# Patient Record
Sex: MALE | Race: OTHER | NOT HISPANIC OR LATINO | URBAN - METROPOLITAN AREA
[De-identification: names, ages, dates, MRNs, and addresses within clinical notes are randomized per-mention and may not be internally consistent; named-entity substitution may affect disease eponyms.]

---

## 2025-06-03 ENCOUNTER — OFFICE VISIT (OUTPATIENT)
Age: 25
End: 2025-06-03
Payer: COMMERCIAL

## 2025-06-03 VITALS
HEIGHT: 72 IN | DIASTOLIC BLOOD PRESSURE: 88 MMHG | WEIGHT: 315 LBS | HEART RATE: 93 BPM | BODY MASS INDEX: 42.66 KG/M2 | SYSTOLIC BLOOD PRESSURE: 124 MMHG

## 2025-06-03 DIAGNOSIS — K76.0 METABOLIC DYSFUNCTION-ASSOCIATED STEATOTIC LIVER DISEASE (MASLD): ICD-10-CM

## 2025-06-03 DIAGNOSIS — E66.9 OBESITY (BMI 35.0-39.9 WITHOUT COMORBIDITY): Primary | ICD-10-CM

## 2025-06-03 PROCEDURE — 99204 OFFICE O/P NEW MOD 45 MIN: CPT | Performed by: INTERNAL MEDICINE

## 2025-06-03 PROCEDURE — 3008F BODY MASS INDEX DOCD: CPT | Performed by: INTERNAL MEDICINE

## 2025-06-03 PROCEDURE — 99214 OFFICE O/P EST MOD 30 MIN: CPT | Performed by: INTERNAL MEDICINE

## 2025-06-03 RX ORDER — ALBUTEROL SULFATE 90 UG/1
2 INHALANT RESPIRATORY (INHALATION) EVERY 4 HOURS PRN
COMMUNITY
Start: 2015-03-09

## 2025-06-03 RX ORDER — ERGOCALCIFEROL 1.25 MG/1
1.25 CAPSULE ORAL WEEKLY
COMMUNITY

## 2025-06-03 ASSESSMENT — ENCOUNTER SYMPTOMS
LOSS OF SENSATION IN FEET: 0
DEPRESSION: 0
OCCASIONAL FEELINGS OF UNSTEADINESS: 0

## 2025-06-03 ASSESSMENT — PATIENT HEALTH QUESTIONNAIRE - PHQ9
2. FEELING DOWN, DEPRESSED OR HOPELESS: NOT AT ALL
1. LITTLE INTEREST OR PLEASURE IN DOING THINGS: NOT AT ALL
SUM OF ALL RESPONSES TO PHQ9 QUESTIONS 1 AND 2: 0

## 2025-06-03 NOTE — PROGRESS NOTES
Patient is seen at the request of Self Referred for my opinion regarding obesity and weight management. My final recommendations will be communicated back to the requesting provider by way of shared medical record.    Marcel Quinteros is a 25 y.o. male with a hx of morbid obesity s/p gastric sleeve bariatric surgery, MARIA ESTHER, Obesity hypoventilation syndrome, NAFLD and insulin resistance who presents for weight management and obesity.    1. Weight history: Max weight reported: 560lb pre bariatric surgery. Current weight: 357lb.  Endorses issues with weight management and excess calorie intake since teens.  --Any previous programs: Has attempted many including carnivore diet, keto, weight watchers, etc with varying results but overall inconsistent, and difficult to sustain.  Currently no diet but trying to eat healthy.    - As for medications for weight management, has been followed by [facility] weight management clinic for several years, now transferring care due to insurance change.  Per patient's report and record review he tried Topamax with phentermine for several months with good results in appetite management but this caused elevated BP.  Was on Zepbound for approximately 1 month this year at a dose of 5mg weekly but stopped due to cost, is now taking compounded Terzepatide 7.5mg weekly for past 5 weeks (still has 5 more week supply). Has not seen major weight loss over these 5 weeks although he has not been routinely monitoring this.    2. Sleep: inconsistent sleep, waking up during the night     3. Stress: manageable as per patient. Works and studies full time     4. Exercise: no formal exercise.     5. Appetite control: endorses good appetite, eating less since his bariatric surgery.  Any personal history of pancreatitis?: no  Any personal or family history of medullary thyroid cancer or MEN (multiple endocrine neoplasia)?: no    Current Medications[1]    ROS:  System: normal  Eyes : no visual  changes  Neck : no tenderness, no new lumps/bumps  Respiratory : no SOB  Cardiovascular : no chest pain, no palpitations  Gastro-Intestinal : no abdominal concerns  Neurological : no numbness or tingling in the extremities  Musculoskeletal : no joint paint, no muscle pain  Skin : no unusual rashes  Psychiatric : no depression, no anxiety  See HPI for Endocrine ROS    Medical History[2]    Surgical History[3]    Social History     Socioeconomic History    Marital status: Single     Spouse name: Not on file    Number of children: Not on file    Years of education: Not on file    Highest education level: Not on file   Occupational History    Not on file   Tobacco Use    Smoking status: Not on file    Smokeless tobacco: Not on file   Substance and Sexual Activity    Alcohol use: Not on file    Drug use: Not on file    Sexual activity: Not on file   Other Topics Concern    Not on file   Social History Narrative    Not on file     Social Drivers of Health     Financial Resource Strain: Not on file   Food Insecurity: Not on file   Transportation Needs: Not on file   Physical Activity: Not on file   Stress: Not on file   Social Connections: Not on file   Intimate Partner Violence: Not on file   Housing Stability: Not on file     Objective    Physical Exam:  Blood pressure 124/88, pulse 93, height 1.829 m (6'), weight (!) 162 kg (357 lb 9.4 oz).  General : alert and oriented X3, no acute distress  Eyes : EOMI   Neck : non tender, supple  Thyroid : Non tender, no bruit, no palpable nodules  Lungs : CTA B  Heart : regular rate and rhythm  ABD : no obvious abnormalities, soft to palpation  Extremities : no edema  Neuro : normal    Weight Curve From Prior Follow Up:      Assessment/Plan   Doug Quinteros is a 25 y.o. male with a hx of morbid obesity s/p gastric sleeve bariatric surgery, and resolved pre DM2 who presents for weight management and obesity.    1. Weight Management : Reviewed principles of energy metabolism,  caloric intake and expenditure, and rationale for a treatment program.  Also reinforced need for reduced calorie, low fat diet and increased physical activity.  Reviewed his inconsistent use of Tirzepatide and why he has not seen major weigh benefits yet. Patient endorsed interest in looking into the option of switching to a brand name [email or web address] for which we will explore which options have better coverage with his current insurance.  Lastly patient interested in dietitian referral as well as referral for follow up in weight management groups with Kalyani SUMNER CNP.    Plan:  - Will check for Mounjaro coverage, if cost is limiting will check coverage for Ozempic  - Follow up with Nutrition  - Follow up with Weigh management group  - Check A1C, 2h glucose tolerance test, CMP, Vitamin D, and Iron      Hardy Balderas MD       [1]   Current Outpatient Medications:     albuterol (ProAir HFA) 90 mcg/actuation inhaler, Inhale 2 puffs every 4 hours if needed., Disp: , Rfl:     ergocalciferol (Vitamin D-2) 1250 mcg (50,000 units) capsule, Take 1 capsule (1.25 mg) by mouth 1 (one) time per week., Disp: , Rfl:     [START ON 6/8/2025] tirzepatide (Mounjaro) 7.5 mg/0.5 mL pen injector, Inject 7.5 mg under the skin 1 (one) time per week., Disp: 2 mL, Rfl: 5    tirzepatide 7.5 mg/0.5 mL pen injector, Inject 7.5 mg under the skin 1 (one) time per week., Disp: , Rfl:   [2] No past medical history on file.  [3] No past surgical history on file.

## 2025-06-04 PROBLEM — E66.9 OBESITY (BMI 35.0-39.9 WITHOUT COMORBIDITY): Status: ACTIVE | Noted: 2025-06-04

## 2025-06-04 PROBLEM — K76.0 METABOLIC DYSFUNCTION-ASSOCIATED STEATOTIC LIVER DISEASE (MASLD): Status: ACTIVE | Noted: 2025-06-04

## 2025-06-04 RX ORDER — TIRZEPATIDE 7.5 MG/.5ML
7.5 INJECTION, SOLUTION SUBCUTANEOUS
Qty: 2 ML | Refills: 5 | Status: SHIPPED | OUTPATIENT
Start: 2025-06-08 | End: 2025-06-05

## 2025-06-05 ENCOUNTER — TELEPHONE (OUTPATIENT)
Dept: ENDOCRINOLOGY | Facility: CLINIC | Age: 25
End: 2025-06-05
Payer: COMMERCIAL

## 2025-06-05 DIAGNOSIS — K76.0 METABOLIC DYSFUNCTION-ASSOCIATED STEATOTIC LIVER DISEASE (MASLD): ICD-10-CM

## 2025-06-05 DIAGNOSIS — E66.9 OBESITY (BMI 35.0-39.9 WITHOUT COMORBIDITY): ICD-10-CM

## 2025-06-05 RX ORDER — NALTREXONE HYDROCHLORIDE AND BUPROPION HYDROCHLORIDE 8; 90 MG/1; MG/1
TABLET, EXTENDED RELEASE ORAL
Qty: 1 TABLET | Refills: 0 | OUTPATIENT
Start: 2025-06-05

## 2025-06-05 NOTE — TELEPHONE ENCOUNTER
Prior Auth for mounjaro pending determination  Submitted on 6/4 via cmm    KEY: [id number]      Prior Auth for zepbound pending determination  Submitted on 6/4 via cmm    KEY: [id number]

## 2025-06-10 ENCOUNTER — TELEPHONE (OUTPATIENT)
Age: 25
End: 2025-06-10
Payer: COMMERCIAL

## 2025-06-10 NOTE — TELEPHONE ENCOUNTER
Spoke with [facility] pharmacist regarding zepbound prescription denial. Prescription has been re-run through patient's insurance multiple times without receiving approval.     Contacted Mr. Quinteros's insurance. Zepbound was likely denied at the pharmacy because the prior authorization is approved for Zepbound 7.5 mg not 10 mg. Representative suggested that a new prior authorization be submitted to [facility].    Prior Auth for Zepbound  10 mg injection pen pending determination. Submitted on 6/10 via CoverMyMeds.    KEY: [id number]

## 2025-06-12 DIAGNOSIS — G47.33 OSA (OBSTRUCTIVE SLEEP APNEA): ICD-10-CM

## 2025-06-12 DIAGNOSIS — E66.9 OBESITY (BMI 35.0-39.9 WITHOUT COMORBIDITY): Primary | ICD-10-CM

## 2025-06-16 ENCOUNTER — APPOINTMENT (OUTPATIENT)
Dept: ENDOCRINOLOGY | Facility: CLINIC | Age: 25
End: 2025-06-16
Payer: COMMERCIAL

## 2025-06-19 ENCOUNTER — TELEPHONE (OUTPATIENT)
Age: 25
End: 2025-06-19
Payer: COMMERCIAL

## 2025-06-19 NOTE — TELEPHONE ENCOUNTER
Prior Auth for Ozempic 0.5 mg pen injector pending determination. Submitted on 6/19 via CoverMyMeds.    KEY: [id number]

## 2025-06-20 NOTE — TELEPHONE ENCOUNTER
"Prior authorization for Ozempic 0.25 mg pen injector denied. The insurance plan states, \"there are no pharmacy and therapeutic committee approved off-label use criteria for the diagnosis.\".   "

## 2025-07-01 DIAGNOSIS — E66.9 OBESITY (BMI 35.0-39.9 WITHOUT COMORBIDITY): Primary | ICD-10-CM

## 2025-07-01 DIAGNOSIS — K76.0 METABOLIC DYSFUNCTION-ASSOCIATED STEATOTIC LIVER DISEASE (MASLD): ICD-10-CM

## 2025-07-29 DIAGNOSIS — G47.33 OSA (OBSTRUCTIVE SLEEP APNEA): ICD-10-CM

## 2025-07-29 DIAGNOSIS — K76.0 METABOLIC DYSFUNCTION-ASSOCIATED STEATOTIC LIVER DISEASE (MASLD): Primary | ICD-10-CM

## 2025-07-29 DIAGNOSIS — E66.9 OBESITY (BMI 35.0-39.9 WITHOUT COMORBIDITY): ICD-10-CM

## 2026-01-07 ENCOUNTER — APPOINTMENT (OUTPATIENT)
Dept: ENDOCRINOLOGY | Facility: CLINIC | Age: 26
End: 2026-01-07
Payer: COMMERCIAL